# Patient Record
Sex: FEMALE | Race: OTHER | HISPANIC OR LATINO | URBAN - METROPOLITAN AREA
[De-identification: names, ages, dates, MRNs, and addresses within clinical notes are randomized per-mention and may not be internally consistent; named-entity substitution may affect disease eponyms.]

---

## 2017-07-05 ENCOUNTER — EMERGENCY (EMERGENCY)
Facility: HOSPITAL | Age: 62
LOS: 1 days | Discharge: HOME | End: 2017-07-05
Attending: EMERGENCY MEDICINE | Admitting: EMERGENCY MEDICINE
Payer: COMMERCIAL

## 2017-07-05 VITALS
RESPIRATION RATE: 20 BRPM | HEART RATE: 76 BPM | TEMPERATURE: 99 F | OXYGEN SATURATION: 96 % | DIASTOLIC BLOOD PRESSURE: 81 MMHG | SYSTOLIC BLOOD PRESSURE: 147 MMHG

## 2017-07-05 PROCEDURE — 99284 EMERGENCY DEPT VISIT MOD MDM: CPT

## 2017-07-05 RX ORDER — OXYCODONE AND ACETAMINOPHEN 5; 325 MG/1; MG/1
1 TABLET ORAL ONCE
Qty: 0 | Refills: 0 | Status: DISCONTINUED | OUTPATIENT
Start: 2017-07-05 | End: 2017-07-05

## 2017-07-05 RX ADMIN — Medication 300 MILLIGRAM(S): at 19:27

## 2017-07-05 RX ADMIN — OXYCODONE AND ACETAMINOPHEN 1 TABLET(S): 5; 325 TABLET ORAL at 19:27

## 2017-07-05 NOTE — CONSULT NOTE ADULT - CONSULT REASON
Left facial swelling after root canal, concern for abscess
Left eye swelling s/p root canal therapy. Concern for abscess.

## 2017-07-05 NOTE — ED PROVIDER NOTE - ENMT, MLM
Airway patent, Nasal mucosa clear. Mouth with normal mucosa. left buccal area swelling, no discharge intraorally

## 2017-07-05 NOTE — ED PROVIDER NOTE - MEDICAL DECISION MAKING DETAILS
patient with left facial swelling after recent root canal. concern for cellulitis vs abscess. will consult dental

## 2017-07-05 NOTE — ED ADULT NURSE NOTE - OBJECTIVE STATEMENT
pt c/o left facial pain associated with edema s/p dental procedure a few days ago.  as per pt, she had the procedure and was given abx to take.  her face continued to get more swollen with more pain.  pt is awake, alert and responsive to all stimuli.  no sob or respiratory distress noted at this time.  pt medicated, as per md's orders and is awaiting dental team for further assessment.  will continue to monitor.

## 2017-07-05 NOTE — CONSULT NOTE ADULT - SUBJECTIVE AND OBJECTIVE BOX
Patient is a 62y old  Female who presents with a chief complaint of left eye swelling s/p root canal therapy.    HPI: non-contributory    PAST MEDICAL & SURGICAL HISTORY:  No pertinent past medical history  No significant past surgical history    MEDICATIONS (home):  Azitrhomycin  Percocet    MEDICATIONS  (PRN):      Allergies:  Penicillins (Rash)    *Last Dental Visit: Monday July 3rd, 2017 for Root canal therapy    Vital Signs Last 24 Hrs  T(C): 37.3 (05 Jul 2017 18:19), Max: 37.3 (05 Jul 2017 18:19)  T(F): 99.2 (05 Jul 2017 18:19), Max: 99.2 (05 Jul 2017 18:19)  HR: 76 (05 Jul 2017 18:19) (76 - 76)  BP: 147/81 (05 Jul 2017 18:19) (147/81 - 147/81)  BP(mean): --  RR: 20 (05 Jul 2017 18:19) (20 - 20)  SpO2: 96% (05 Jul 2017 18:19) (96% - 96%)    EOE:  TMJ (   ) clicks                    (    ) pops                    (    ) crepitus             Mandible <<FROM>>             Facial bones and MOM <<grossly intact>>             (   ) trismus             (   ) LAD             (   ) swelling             (   ) asymmetry             (   ) palpation             (   ) SOB             (   ) dysphagia             (   ) LOC    IOE:  <<permanent/primary/mixed>> dentition: <<grossly intact>> OR <<multiple carious teeth>> OR <<multiple missing teeth>>           hard/soft palate:  (   ) palatal torus           tongue/FOM <<WNL>>           labial/buccal mucosa <<WNL>>           (   ) percussion           (   ) palpation           (   ) swelling     Dentition present: <<   >>  Mobility: <<  >>  Caries: <<   >>     Radiographs:    LABS:                        RADIOLOGY & ADDITIONAL STUDIES:    ASSESSMENT:    PROCEDURE:  Verbal and written consent given.     RECOMMENDATIONS:   1) <<   >>  2) Dental F/U with outpatient dentist for comprehensive dental care.   3) If any difficulty swallowing/breathing, fever occur, page dental.     Resident Name, pager #  Oral surgeon consulted:
Patient is a 62y old  Female who presents with a chief complaint of swelling on pain in upper left area after root canal treatment 2 days ago.    HPI: patient had root canal treatment tooth #13 on Monday 7/3/17. Canals obturated and IRM placed. Monday night pt experienced pain that worsened into Tuesday. Her dentist prescribed her azithromycin 250mg, ibuprofen 600mg and hydrocodone. Patient reports that medicines did not help alleviate pain. Starting Wednesday, throbbing pain spread to her orbital area and down the left side of her face.      PAST MEDICAL & SURGICAL HISTORY:  No pertinent past medical history  No significant past surgical history    ( -  ) heart valve replacement  ( -  ) joint replacement  (  - ) pregnancy    MEDICATIONS  (STANDING):    MEDICATIONS  (PRN): Ibuprofen 600mg, azithromycin 250mg, hydrocodone 300mg (prescribed by dentist who performed root canal)  Clindamycin 300mg, oxycodone 5mg/acetominophen 325mg (prescribed by ER attending Dr. Isaac)      Allergies    penicillins (Rash)      *Last Dental Visit: 7/3/17    Vital Signs Last 24 Hrs  T(C): 37.3 (05 Jul 2017 18:19), Max: 37.3 (05 Jul 2017 18:19)  T(F): 99.2 (05 Jul 2017 18:19), Max: 99.2 (05 Jul 2017 18:19)  HR: 76 (05 Jul 2017 18:19) (76 - 76)  BP: 147/81 (05 Jul 2017 18:19) (147/81 - 147/81)  BP(mean): --  RR: 20 (05 Jul 2017 18:19) (20 - 20)  SpO2: 96% (05 Jul 2017 18:19) (96% - 96%)    EOE:  TMJ (  - ) clicks                    (  -  ) pops                    ( -   ) crepitus             Mandible FROM             Facial bones and MOM grossly intact             (  + ) trismus             (  - ) LAD             (  + ) swelling             ( +  ) asymmetry             ( +  ) palpation             (  - ) SOB             ( -  ) dysphagia               IOE:  permanent dentition:multiple missing teeth           hard/soft palate:  ( -  ) palatal torus           tongue/FOM WNL           labial/buccal mucosa: upper left mucosa firm but erythematous            (  + ) percussion           (   +) palpation           (  + ) swelling            ( -  ) abscess           (  - ) sinus tract    Dentition present: permanent  Mobility: 0  Caries: none    LABS: none    *DENTAL RADIOGRAPHS: one periapical radiograph of #12 and #13 taken, panoramic radiograph unable to obtain due to technical issues    RADIOLOGY & ADDITIONAL STUDIES: none    *ASSESSMENT:    Clinical evaluation reveals extraoral swelling and asymmetry of left side of patient's face. Swelling extends from inferior orbital rim to the superior height of the mandible. Intraorally, patient was tender upon palpation of palatal and buccal mucosa around #12, #13. However, the buccal vestibule felt firm, not fluctuant. Buccal mucosa around #12, #13 was erythematous. Radiographic findings include widened PDL of #11, #12, #13.       RECOMMENDATIONS:  1) Continue Clindamycin 300mg TID, oxycodone Q4h that was prescribed by Dr. Isaac  2) Dental F/U with dentist who performed root canal #13 or return to dental clinic if she cannot see him within the next 2 days.  3) If any difficulty swallowing/breathing, fever occur, return to ER.     Malina Calero DDS pager #[phone]

## 2017-09-04 ENCOUNTER — RESULT REVIEW (OUTPATIENT)
Age: 62
End: 2017-09-04

## 2018-06-13 NOTE — ED PROVIDER NOTE - RESPIRATORY NEGATIVE STATEMENT, MLM
Subjective:       Patient ID: Leona Pizano is a 45 y.o. female.    Chief Complaint: Follow-up (2 weeks )    HPI: Pt presents to clinic today known to me for bp f/u. She brought in her logs 140/97 hr 93>>128/81 Hr 75>>117/82 hr 75>>120/80 hr 75>>121/87 HR80. She reports that she is feeling well. No sluggishness. No dizziness.   Review of Systems   Constitutional: Negative for chills, fatigue, fever and unexpected weight change.   HENT: Negative for congestion, ear pain, sore throat and trouble swallowing.    Eyes: Negative for pain and visual disturbance.   Respiratory: Negative for cough, chest tightness and shortness of breath.    Cardiovascular: Negative for chest pain, palpitations and leg swelling.   Gastrointestinal: Negative for abdominal distention, abdominal pain, constipation, diarrhea and vomiting.   Genitourinary: Negative for difficulty urinating, dysuria, flank pain, frequency and hematuria.   Musculoskeletal: Negative for back pain, gait problem, joint swelling, neck pain and neck stiffness.   Skin: Negative for rash and wound.   Neurological: Negative for dizziness, seizures, speech difficulty, light-headedness and headaches.       Objective:      Physical Exam   Constitutional: She is oriented to person, place, and time. She appears well-developed and well-nourished.   HENT:   Head: Normocephalic and atraumatic.   Eyes: Conjunctivae and EOM are normal. Pupils are equal, round, and reactive to light.   Neck: Normal range of motion. Neck supple.   Cardiovascular: Normal rate, regular rhythm, normal heart sounds and intact distal pulses.    No murmur heard.  Pulmonary/Chest: Effort normal and breath sounds normal. No respiratory distress. She has no wheezes. She has no rales.   Abdominal: Soft. Bowel sounds are normal. She exhibits no distension and no mass. There is no tenderness. There is no guarding.   Musculoskeletal: Normal range of motion.   Neurological: She is alert and oriented to  person, place, and time.   Skin: Skin is warm and dry.   Psychiatric: She has a normal mood and affect. Her behavior is normal. Judgment and thought content normal.   Nursing note and vitals reviewed.      Assessment:       1. Hypertension, unspecified type        Plan:     Problem List Items Addressed This Visit     Hypertension - Primary    Relevant Medications    losartan-hydrochlorothiazide 50-12.5 mg (HYZAAR) 50-12.5 mg per tablet        Combine losartan/hctz     no chest pain, no cough, and no shortness of breath.

## 2018-09-06 ENCOUNTER — RESULT REVIEW (OUTPATIENT)
Age: 63
End: 2018-09-06

## 2018-12-15 ENCOUNTER — TRANSCRIPTION ENCOUNTER (OUTPATIENT)
Age: 63
End: 2018-12-15

## 2019-02-18 ENCOUNTER — TRANSCRIPTION ENCOUNTER (OUTPATIENT)
Age: 64
End: 2019-02-18

## 2019-04-09 ENCOUNTER — EMERGENCY (EMERGENCY)
Facility: HOSPITAL | Age: 64
LOS: 1 days | Discharge: HOME | End: 2019-04-09
Attending: EMERGENCY MEDICINE
Payer: COMMERCIAL

## 2019-04-09 VITALS
TEMPERATURE: 98 F | DIASTOLIC BLOOD PRESSURE: 79 MMHG | OXYGEN SATURATION: 96 % | HEART RATE: 88 BPM | RESPIRATION RATE: 18 BRPM | SYSTOLIC BLOOD PRESSURE: 167 MMHG

## 2019-04-09 VITALS
HEART RATE: 81 BPM | DIASTOLIC BLOOD PRESSURE: 78 MMHG | TEMPERATURE: 98 F | OXYGEN SATURATION: 98 % | RESPIRATION RATE: 18 BRPM | SYSTOLIC BLOOD PRESSURE: 148 MMHG

## 2019-04-09 PROCEDURE — 99283 EMERGENCY DEPT VISIT LOW MDM: CPT

## 2019-04-09 PROCEDURE — 99283 EMERGENCY DEPT VISIT LOW MDM: CPT | Mod: 25

## 2019-04-09 RX ORDER — DIPHENHYDRAMINE HCL 50 MG
50 CAPSULE ORAL ONCE
Qty: 0 | Refills: 0 | Status: COMPLETED | OUTPATIENT
Start: 2019-04-09 | End: 2019-04-09

## 2019-04-09 RX ADMIN — Medication 50 MILLIGRAM(S): at 02:33

## 2019-04-09 RX ADMIN — Medication 50 MILLIGRAM(S): at 02:34

## 2019-04-09 NOTE — ED PROVIDER NOTE - OBJECTIVE STATEMENT
63 female well, on no meds, no pmh, presenting with rash since 3 pm , no new foods, did have rice and chix for lunch, no new . now w very itchy rash. no sob. no abdominal pain. no other all s/s. did not take meds yet.

## 2019-04-09 NOTE — ED ADULT NURSE NOTE - OBJECTIVE STATEMENT
62 y/o female no significant medical history presents to ED c/o urticaria since 3pm 4/8. Pt was at bus stop coming home, noticed many flying bugs by her. Was wearing short sleeves. Doesn't recall being bitten. Since then, pt has been having itching on b/l arms, chest, back, ears, upper lip. Did not take benadryl for discomfort. Denies SOB, chest pain, n/v/d, abd. pain, difficulty breathing. Lungs clear b/l. Hives noted to mentioned areas. Safety and comfort provided.

## 2019-04-09 NOTE — ED ADULT TRIAGE NOTE - CHIEF COMPLAINT QUOTE
itchiness and redness noted to B/L arms   states some upper lip tingling   denies difficulty breathing or swallowing

## 2019-04-10 ENCOUNTER — TRANSCRIPTION ENCOUNTER (OUTPATIENT)
Age: 64
End: 2019-04-10

## 2020-01-19 ENCOUNTER — TRANSCRIPTION ENCOUNTER (OUTPATIENT)
Age: 65
End: 2020-01-19

## 2020-04-25 ENCOUNTER — MESSAGE (OUTPATIENT)
Age: 65
End: 2020-04-25

## 2020-05-06 LAB
SARS-COV-2 IGG SERPL IA-ACNC: 0.2 RATIO
SARS-COV-2 IGG SERPL QL IA: NEGATIVE

## 2020-10-21 ENCOUNTER — OUTPATIENT (OUTPATIENT)
Dept: OUTPATIENT SERVICES | Facility: HOSPITAL | Age: 65
LOS: 1 days | End: 2020-10-21
Payer: COMMERCIAL

## 2020-10-21 ENCOUNTER — NON-APPOINTMENT (OUTPATIENT)
Age: 65
End: 2020-10-21

## 2020-10-21 ENCOUNTER — APPOINTMENT (OUTPATIENT)
Dept: CARDIOLOGY | Facility: CLINIC | Age: 65
End: 2020-10-21
Payer: COMMERCIAL

## 2020-10-21 VITALS
SYSTOLIC BLOOD PRESSURE: 135 MMHG | WEIGHT: 148 LBS | BODY MASS INDEX: 29.84 KG/M2 | DIASTOLIC BLOOD PRESSURE: 74 MMHG | HEIGHT: 59 IN | HEART RATE: 70 BPM | OXYGEN SATURATION: 97 %

## 2020-10-21 DIAGNOSIS — Z83.3 FAMILY HISTORY OF DIABETES MELLITUS: ICD-10-CM

## 2020-10-21 DIAGNOSIS — Z82.49 FAMILY HISTORY OF ISCHEMIC HEART DISEASE AND OTHER DISEASES OF THE CIRCULATORY SYSTEM: ICD-10-CM

## 2020-10-21 DIAGNOSIS — Z78.9 OTHER SPECIFIED HEALTH STATUS: ICD-10-CM

## 2020-10-21 DIAGNOSIS — R07.9 CHEST PAIN, UNSPECIFIED: ICD-10-CM

## 2020-10-21 DIAGNOSIS — Z87.891 PERSONAL HISTORY OF NICOTINE DEPENDENCE: ICD-10-CM

## 2020-10-21 PROCEDURE — 99205 OFFICE O/P NEW HI 60 MIN: CPT | Mod: 25

## 2020-10-21 PROCEDURE — 93018 CV STRESS TEST I&R ONLY: CPT

## 2020-10-21 PROCEDURE — 93017 CV STRESS TEST TRACING ONLY: CPT

## 2020-10-21 PROCEDURE — 93016 CV STRESS TEST SUPVJ ONLY: CPT

## 2020-10-21 PROCEDURE — 93000 ELECTROCARDIOGRAM COMPLETE: CPT

## 2020-10-21 PROCEDURE — 99401 PREV MED CNSL INDIV APPRX 15: CPT

## 2020-10-21 RX ORDER — ADHESIVE TAPE 3"X 2.3 YD
50 MCG TAPE, NON-MEDICATED TOPICAL
Refills: 0 | Status: DISCONTINUED | COMMUNITY
Start: 2020-10-21 | End: 2020-10-21

## 2020-10-21 RX ORDER — ERGOCALCIFEROL 1.25 MG/1
1.25 MG CAPSULE, LIQUID FILLED ORAL
Refills: 0 | Status: ACTIVE | COMMUNITY
Start: 2020-10-21

## 2020-10-26 ENCOUNTER — OUTPATIENT (OUTPATIENT)
Dept: OUTPATIENT SERVICES | Facility: HOSPITAL | Age: 65
LOS: 1 days | End: 2020-10-26
Payer: COMMERCIAL

## 2020-10-26 ENCOUNTER — APPOINTMENT (OUTPATIENT)
Dept: CV DIAGNOSITCS | Facility: HOSPITAL | Age: 65
End: 2020-10-26

## 2020-10-26 DIAGNOSIS — R07.9 CHEST PAIN, UNSPECIFIED: ICD-10-CM

## 2020-10-26 PROCEDURE — 93306 TTE W/DOPPLER COMPLETE: CPT

## 2020-10-26 PROCEDURE — 93306 TTE W/DOPPLER COMPLETE: CPT | Mod: 26

## 2020-11-10 ENCOUNTER — OUTPATIENT (OUTPATIENT)
Dept: OUTPATIENT SERVICES | Facility: HOSPITAL | Age: 65
LOS: 1 days | End: 2020-11-10
Payer: COMMERCIAL

## 2020-11-10 ENCOUNTER — RESULT REVIEW (OUTPATIENT)
Age: 65
End: 2020-11-10

## 2020-11-10 DIAGNOSIS — Z11.59 ENCOUNTER FOR SCREENING FOR OTHER VIRAL DISEASES: ICD-10-CM

## 2020-11-10 LAB — SARS-COV-2 RNA SPEC QL NAA+PROBE: SIGNIFICANT CHANGE UP

## 2020-11-10 PROCEDURE — U0003: CPT

## 2020-11-11 ENCOUNTER — APPOINTMENT (OUTPATIENT)
Dept: CARDIOLOGY | Facility: CLINIC | Age: 65
End: 2020-11-11
Payer: COMMERCIAL

## 2020-11-11 ENCOUNTER — NON-APPOINTMENT (OUTPATIENT)
Age: 65
End: 2020-11-11

## 2020-11-11 VITALS
WEIGHT: 148 LBS | BODY MASS INDEX: 29.84 KG/M2 | OXYGEN SATURATION: 97 % | HEART RATE: 69 BPM | HEIGHT: 59 IN | DIASTOLIC BLOOD PRESSURE: 79 MMHG | SYSTOLIC BLOOD PRESSURE: 139 MMHG

## 2020-11-11 PROCEDURE — 99215 OFFICE O/P EST HI 40 MIN: CPT

## 2020-11-11 PROCEDURE — 93000 ELECTROCARDIOGRAM COMPLETE: CPT

## 2020-11-11 PROCEDURE — 99072 ADDL SUPL MATRL&STAF TM PHE: CPT

## 2020-11-12 ENCOUNTER — FORM ENCOUNTER (OUTPATIENT)
Age: 65
End: 2020-11-12

## 2020-11-13 ENCOUNTER — OUTPATIENT (OUTPATIENT)
Dept: OUTPATIENT SERVICES | Facility: HOSPITAL | Age: 65
LOS: 1 days | End: 2020-11-13
Payer: COMMERCIAL

## 2020-11-13 VITALS
HEART RATE: 66 BPM | OXYGEN SATURATION: 99 % | SYSTOLIC BLOOD PRESSURE: 135 MMHG | TEMPERATURE: 98 F | DIASTOLIC BLOOD PRESSURE: 65 MMHG | RESPIRATION RATE: 20 BRPM

## 2020-11-13 VITALS
RESPIRATION RATE: 18 BRPM | HEART RATE: 60 BPM | DIASTOLIC BLOOD PRESSURE: 64 MMHG | SYSTOLIC BLOOD PRESSURE: 127 MMHG | OXYGEN SATURATION: 98 %

## 2020-11-13 DIAGNOSIS — R94.39 ABNORMAL RESULT OF OTHER CARDIOVASCULAR FUNCTION STUDY: ICD-10-CM

## 2020-11-13 LAB
ANION GAP SERPL CALC-SCNC: 10 MMOL/L — SIGNIFICANT CHANGE UP (ref 5–17)
BUN SERPL-MCNC: 13 MG/DL — SIGNIFICANT CHANGE UP (ref 7–23)
CALCIUM SERPL-MCNC: 9.2 MG/DL — SIGNIFICANT CHANGE UP (ref 8.4–10.5)
CHLORIDE SERPL-SCNC: 108 MMOL/L — SIGNIFICANT CHANGE UP (ref 96–108)
CO2 SERPL-SCNC: 22 MMOL/L — SIGNIFICANT CHANGE UP (ref 22–31)
CREAT SERPL-MCNC: 0.61 MG/DL — SIGNIFICANT CHANGE UP (ref 0.5–1.3)
GLUCOSE SERPL-MCNC: 106 MG/DL — HIGH (ref 70–99)
HCT VFR BLD CALC: 44.5 % — SIGNIFICANT CHANGE UP (ref 34.5–45)
HGB BLD-MCNC: 14.8 G/DL — SIGNIFICANT CHANGE UP (ref 11.5–15.5)
MCHC RBC-ENTMCNC: 31.2 PG — SIGNIFICANT CHANGE UP (ref 27–34)
MCHC RBC-ENTMCNC: 33.3 GM/DL — SIGNIFICANT CHANGE UP (ref 32–36)
MCV RBC AUTO: 93.9 FL — SIGNIFICANT CHANGE UP (ref 80–100)
NRBC # BLD: 0 /100 WBCS — SIGNIFICANT CHANGE UP (ref 0–0)
PLATELET # BLD AUTO: 296 K/UL — SIGNIFICANT CHANGE UP (ref 150–400)
POTASSIUM SERPL-MCNC: 4.5 MMOL/L — SIGNIFICANT CHANGE UP (ref 3.5–5.3)
POTASSIUM SERPL-SCNC: 4.5 MMOL/L — SIGNIFICANT CHANGE UP (ref 3.5–5.3)
RBC # BLD: 4.74 M/UL — SIGNIFICANT CHANGE UP (ref 3.8–5.2)
RBC # FLD: 14.1 % — SIGNIFICANT CHANGE UP (ref 10.3–14.5)
SODIUM SERPL-SCNC: 140 MMOL/L — SIGNIFICANT CHANGE UP (ref 135–145)
WBC # BLD: 6.94 K/UL — SIGNIFICANT CHANGE UP (ref 3.8–10.5)
WBC # FLD AUTO: 6.94 K/UL — SIGNIFICANT CHANGE UP (ref 3.8–10.5)

## 2020-11-13 PROCEDURE — C1769: CPT

## 2020-11-13 PROCEDURE — 93454 CORONARY ARTERY ANGIO S&I: CPT

## 2020-11-13 PROCEDURE — C1894: CPT

## 2020-11-13 PROCEDURE — C1887: CPT

## 2020-11-13 PROCEDURE — 93010 ELECTROCARDIOGRAM REPORT: CPT

## 2020-11-13 PROCEDURE — 93454 CORONARY ARTERY ANGIO S&I: CPT | Mod: 26

## 2020-11-13 PROCEDURE — 85027 COMPLETE CBC AUTOMATED: CPT

## 2020-11-13 PROCEDURE — 99152 MOD SED SAME PHYS/QHP 5/>YRS: CPT

## 2020-11-13 PROCEDURE — 80048 BASIC METABOLIC PNL TOTAL CA: CPT

## 2020-11-13 PROCEDURE — 93005 ELECTROCARDIOGRAM TRACING: CPT

## 2020-11-13 RX ORDER — AZITHROMYCIN 500 MG/1
0 TABLET, FILM COATED ORAL
Qty: 0 | Refills: 0 | DISCHARGE

## 2020-11-13 RX ORDER — CHOLECALCIFEROL (VITAMIN D3) 125 MCG
1 CAPSULE ORAL
Qty: 0 | Refills: 0 | DISCHARGE

## 2020-11-13 NOTE — ASU DISCHARGE PLAN (ADULT/PEDIATRIC) - CALL YOUR DOCTOR IF YOU HAVE ANY OF THE FOLLOWING:
Swelling that gets worse/Pain not relieved by Medications/Fever greater than (need to indicate Fahrenheit or Celsius)/Wound/Surgical Site with redness, or foul smelling discharge or pus/Bleeding that does not stop

## 2020-11-13 NOTE — ASU DISCHARGE PLAN (ADULT/PEDIATRIC) - CARE PROVIDER_API CALL
Rey Hankins J  CARDIOVASCULAR DISEASE  [address], [address] [place] [id number]  Phone: [phone]  Fax: [phone]  Follow Up Time:

## 2020-12-21 NOTE — ED PROVIDER NOTE - CHILD ABUSE FACILITY
[FreeTextEntry1] : The patient has an eating disorder and she was counseled.  She appears to be doing fairly well.  Her weight is up 1-2 pounds from the last visit.  She sees her therapist and a dietician and she has an appointment for tomorrow to see a psychiatrist.  She has good support from her boyfriend and she speaks to her mother.  \par \par Hold off on blood tests today- consider next time.\par \par Follow-up in a month or sooner PRN.\par \par She requests a prescription for a COVID-19 test because she is going home to see her parents in a few days.  
[facility]

## 2021-02-10 ENCOUNTER — APPOINTMENT (OUTPATIENT)
Dept: CARDIOLOGY | Facility: CLINIC | Age: 66
End: 2021-02-10
Payer: COMMERCIAL

## 2021-02-10 ENCOUNTER — NON-APPOINTMENT (OUTPATIENT)
Age: 66
End: 2021-02-10

## 2021-02-10 VITALS
DIASTOLIC BLOOD PRESSURE: 72 MMHG | HEART RATE: 64 BPM | SYSTOLIC BLOOD PRESSURE: 126 MMHG | HEIGHT: 59 IN | BODY MASS INDEX: 29.23 KG/M2 | OXYGEN SATURATION: 96 % | WEIGHT: 145 LBS

## 2021-02-10 PROCEDURE — 93000 ELECTROCARDIOGRAM COMPLETE: CPT

## 2021-02-10 PROCEDURE — 99215 OFFICE O/P EST HI 40 MIN: CPT

## 2021-02-10 PROCEDURE — 99072 ADDL SUPL MATRL&STAF TM PHE: CPT

## 2021-02-11 LAB
ALBUMIN SERPL ELPH-MCNC: 4.2 G/DL
ALP BLD-CCNC: 125 U/L
ALT SERPL-CCNC: 21 U/L
ANION GAP SERPL CALC-SCNC: 11 MMOL/L
AST SERPL-CCNC: 16 U/L
BASOPHILS # BLD AUTO: 0.05 K/UL
BASOPHILS NFR BLD AUTO: 0.6 %
BILIRUB SERPL-MCNC: 0.5 MG/DL
BUN SERPL-MCNC: 15 MG/DL
CALCIUM SERPL-MCNC: 9.4 MG/DL
CHLORIDE SERPL-SCNC: 105 MMOL/L
CHOLEST SERPL-MCNC: 153 MG/DL
CO2 SERPL-SCNC: 23 MMOL/L
CREAT SERPL-MCNC: 0.85 MG/DL
EOSINOPHIL # BLD AUTO: 0.12 K/UL
EOSINOPHIL NFR BLD AUTO: 1.5 %
ESTIMATED AVERAGE GLUCOSE: 128 MG/DL
GLUCOSE SERPL-MCNC: 100 MG/DL
HBA1C MFR BLD HPLC: 6.1 %
HCT VFR BLD CALC: 44.4 %
HDLC SERPL-MCNC: 52 MG/DL
HGB BLD-MCNC: 14.3 G/DL
IMM GRANULOCYTES NFR BLD AUTO: 0.2 %
LDLC SERPL CALC-MCNC: 65 MG/DL
LYMPHOCYTES # BLD AUTO: 2.61 K/UL
LYMPHOCYTES NFR BLD AUTO: 32 %
MAN DIFF?: NORMAL
MCHC RBC-ENTMCNC: 30.8 PG
MCHC RBC-ENTMCNC: 32.2 GM/DL
MCV RBC AUTO: 95.5 FL
MONOCYTES # BLD AUTO: 0.45 K/UL
MONOCYTES NFR BLD AUTO: 5.5 %
NEUTROPHILS # BLD AUTO: 4.91 K/UL
NEUTROPHILS NFR BLD AUTO: 60.2 %
NONHDLC SERPL-MCNC: 101 MG/DL
NT-PROBNP SERPL-MCNC: 91 PG/ML
PLATELET # BLD AUTO: 276 K/UL
POTASSIUM SERPL-SCNC: 4.8 MMOL/L
PROT SERPL-MCNC: 6.8 G/DL
RBC # BLD: 4.65 M/UL
RBC # FLD: 14.3 %
SODIUM SERPL-SCNC: 139 MMOL/L
TRIGL SERPL-MCNC: 180 MG/DL
TSH SERPL-ACNC: 2.15 UIU/ML
WBC # FLD AUTO: 8.16 K/UL

## 2021-02-19 ENCOUNTER — TRANSCRIPTION ENCOUNTER (OUTPATIENT)
Age: 66
End: 2021-02-19

## 2021-02-20 ENCOUNTER — APPOINTMENT (OUTPATIENT)
Dept: ULTRASOUND IMAGING | Facility: IMAGING CENTER | Age: 66
End: 2021-02-20
Payer: COMMERCIAL

## 2021-02-20 ENCOUNTER — APPOINTMENT (OUTPATIENT)
Dept: MAMMOGRAPHY | Facility: IMAGING CENTER | Age: 66
End: 2021-02-20
Payer: COMMERCIAL

## 2021-02-20 ENCOUNTER — OUTPATIENT (OUTPATIENT)
Dept: OUTPATIENT SERVICES | Facility: HOSPITAL | Age: 66
LOS: 1 days | End: 2021-02-20
Payer: COMMERCIAL

## 2021-02-20 DIAGNOSIS — Z00.8 ENCOUNTER FOR OTHER GENERAL EXAMINATION: ICD-10-CM

## 2021-02-20 PROCEDURE — 76641 ULTRASOUND BREAST COMPLETE: CPT

## 2021-02-20 PROCEDURE — 77067 SCR MAMMO BI INCL CAD: CPT | Mod: 26

## 2021-02-20 PROCEDURE — 77067 SCR MAMMO BI INCL CAD: CPT

## 2021-02-20 PROCEDURE — 76641 ULTRASOUND BREAST COMPLETE: CPT | Mod: 26,50

## 2021-02-20 PROCEDURE — 77063 BREAST TOMOSYNTHESIS BI: CPT

## 2021-02-20 PROCEDURE — 77063 BREAST TOMOSYNTHESIS BI: CPT | Mod: 26

## 2021-06-03 ENCOUNTER — TRANSCRIPTION ENCOUNTER (OUTPATIENT)
Age: 66
End: 2021-06-03

## 2021-06-12 ENCOUNTER — TRANSCRIPTION ENCOUNTER (OUTPATIENT)
Age: 66
End: 2021-06-12

## 2021-08-11 ENCOUNTER — NON-APPOINTMENT (OUTPATIENT)
Age: 66
End: 2021-08-11

## 2021-08-11 ENCOUNTER — APPOINTMENT (OUTPATIENT)
Dept: CARDIOLOGY | Facility: CLINIC | Age: 66
End: 2021-08-11
Payer: COMMERCIAL

## 2021-08-11 VITALS
BODY MASS INDEX: 29.84 KG/M2 | DIASTOLIC BLOOD PRESSURE: 75 MMHG | HEIGHT: 59 IN | HEART RATE: 67 BPM | WEIGHT: 148 LBS | OXYGEN SATURATION: 96 % | SYSTOLIC BLOOD PRESSURE: 117 MMHG

## 2021-08-11 PROCEDURE — 93000 ELECTROCARDIOGRAM COMPLETE: CPT

## 2021-08-11 PROCEDURE — 99215 OFFICE O/P EST HI 40 MIN: CPT

## 2022-02-16 ENCOUNTER — APPOINTMENT (OUTPATIENT)
Dept: CARDIOLOGY | Facility: CLINIC | Age: 67
End: 2022-02-16
Payer: COMMERCIAL

## 2022-02-16 ENCOUNTER — NON-APPOINTMENT (OUTPATIENT)
Age: 67
End: 2022-02-16

## 2022-02-16 VITALS
WEIGHT: 148 LBS | DIASTOLIC BLOOD PRESSURE: 79 MMHG | HEART RATE: 74 BPM | HEIGHT: 59 IN | OXYGEN SATURATION: 96 % | SYSTOLIC BLOOD PRESSURE: 133 MMHG | BODY MASS INDEX: 29.84 KG/M2

## 2022-02-16 PROCEDURE — 99402 PREV MED CNSL INDIV APPRX 30: CPT

## 2022-02-16 PROCEDURE — 99215 OFFICE O/P EST HI 40 MIN: CPT | Mod: 25

## 2022-02-16 PROCEDURE — 93000 ELECTROCARDIOGRAM COMPLETE: CPT

## 2022-03-22 ENCOUNTER — RESULT REVIEW (OUTPATIENT)
Age: 67
End: 2022-03-22

## 2022-04-09 ENCOUNTER — APPOINTMENT (OUTPATIENT)
Dept: MAMMOGRAPHY | Facility: CLINIC | Age: 67
End: 2022-04-09
Payer: COMMERCIAL

## 2022-04-09 ENCOUNTER — OUTPATIENT (OUTPATIENT)
Dept: OUTPATIENT SERVICES | Facility: HOSPITAL | Age: 67
LOS: 1 days | End: 2022-04-09
Payer: COMMERCIAL

## 2022-04-09 ENCOUNTER — APPOINTMENT (OUTPATIENT)
Dept: ULTRASOUND IMAGING | Facility: CLINIC | Age: 67
End: 2022-04-09
Payer: COMMERCIAL

## 2022-04-09 DIAGNOSIS — Z00.8 ENCOUNTER FOR OTHER GENERAL EXAMINATION: ICD-10-CM

## 2022-04-09 PROCEDURE — 77067 SCR MAMMO BI INCL CAD: CPT | Mod: 26

## 2022-04-09 PROCEDURE — 77067 SCR MAMMO BI INCL CAD: CPT

## 2022-04-09 PROCEDURE — 77063 BREAST TOMOSYNTHESIS BI: CPT | Mod: 26

## 2022-04-09 PROCEDURE — 77063 BREAST TOMOSYNTHESIS BI: CPT

## 2022-07-01 ENCOUNTER — RESULT REVIEW (OUTPATIENT)
Age: 67
End: 2022-07-01

## 2023-01-01 ENCOUNTER — RX RENEWAL (OUTPATIENT)
Age: 68
End: 2023-01-01

## 2023-01-01 RX ORDER — ATORVASTATIN CALCIUM 40 MG/1
40 TABLET, FILM COATED ORAL
Qty: 90 | Refills: 2 | Status: ACTIVE | COMMUNITY
Start: 2020-10-21 | End: 1900-01-01

## 2023-04-14 ENCOUNTER — APPOINTMENT (OUTPATIENT)
Dept: MAMMOGRAPHY | Facility: IMAGING CENTER | Age: 68
End: 2023-04-14
Payer: COMMERCIAL

## 2023-04-14 ENCOUNTER — OUTPATIENT (OUTPATIENT)
Dept: OUTPATIENT SERVICES | Facility: HOSPITAL | Age: 68
LOS: 1 days | End: 2023-04-14
Payer: COMMERCIAL

## 2023-04-14 DIAGNOSIS — Z00.8 ENCOUNTER FOR OTHER GENERAL EXAMINATION: ICD-10-CM

## 2023-04-14 PROCEDURE — 77067 SCR MAMMO BI INCL CAD: CPT

## 2023-04-14 PROCEDURE — 77063 BREAST TOMOSYNTHESIS BI: CPT

## 2023-04-14 PROCEDURE — 77067 SCR MAMMO BI INCL CAD: CPT | Mod: 26

## 2023-04-14 PROCEDURE — 77063 BREAST TOMOSYNTHESIS BI: CPT | Mod: 26

## 2023-10-23 ENCOUNTER — NON-APPOINTMENT (OUTPATIENT)
Age: 68
End: 2023-10-23

## 2023-10-23 ENCOUNTER — APPOINTMENT (OUTPATIENT)
Dept: CARDIOLOGY | Facility: CLINIC | Age: 68
End: 2023-10-23
Payer: COMMERCIAL

## 2023-10-23 VITALS
BODY MASS INDEX: 29.84 KG/M2 | OXYGEN SATURATION: 99 % | WEIGHT: 148 LBS | HEIGHT: 59 IN | HEART RATE: 71 BPM | DIASTOLIC BLOOD PRESSURE: 77 MMHG | SYSTOLIC BLOOD PRESSURE: 144 MMHG

## 2023-10-23 DIAGNOSIS — R00.2 PALPITATIONS: ICD-10-CM

## 2023-10-23 DIAGNOSIS — R07.9 CHEST PAIN, UNSPECIFIED: ICD-10-CM

## 2023-10-23 PROCEDURE — 93000 ELECTROCARDIOGRAM COMPLETE: CPT

## 2023-10-23 PROCEDURE — 99204 OFFICE O/P NEW MOD 45 MIN: CPT

## 2023-11-06 ENCOUNTER — NON-APPOINTMENT (OUTPATIENT)
Age: 68
End: 2023-11-06

## 2023-11-18 PROBLEM — R00.2 HEART PALPITATIONS: Status: ACTIVE | Noted: 2020-10-21

## 2023-11-18 PROBLEM — R07.9 CHEST PAIN, CARDIAC: Status: ACTIVE | Noted: 2020-10-21

## 2024-01-03 ENCOUNTER — APPOINTMENT (OUTPATIENT)
Dept: CARDIOLOGY | Facility: CLINIC | Age: 69
End: 2024-01-03

## 2024-01-22 ENCOUNTER — APPOINTMENT (OUTPATIENT)
Dept: CV DIAGNOSITCS | Facility: HOSPITAL | Age: 69
End: 2024-01-22

## 2024-01-22 ENCOUNTER — OUTPATIENT (OUTPATIENT)
Dept: OUTPATIENT SERVICES | Facility: HOSPITAL | Age: 69
LOS: 1 days | End: 2024-01-22
Payer: COMMERCIAL

## 2024-01-22 VITALS
WEIGHT: 147.93 LBS | HEIGHT: 59 IN | DIASTOLIC BLOOD PRESSURE: 96 MMHG | TEMPERATURE: 99 F | SYSTOLIC BLOOD PRESSURE: 155 MMHG | RESPIRATION RATE: 12 BRPM | HEART RATE: 55 BPM | OXYGEN SATURATION: 98 %

## 2024-01-22 VITALS
HEART RATE: 50 BPM | DIASTOLIC BLOOD PRESSURE: 66 MMHG | SYSTOLIC BLOOD PRESSURE: 148 MMHG | OXYGEN SATURATION: 97 % | RESPIRATION RATE: 18 BRPM

## 2024-01-22 DIAGNOSIS — I25.10 ATHEROSCLEROTIC HEART DISEASE OF NATIVE CORONARY ARTERY WITHOUT ANGINA PECTORIS: ICD-10-CM

## 2024-01-22 PROCEDURE — 93312 ECHO TRANSESOPHAGEAL: CPT | Mod: 26

## 2024-01-22 PROCEDURE — 76377 3D RENDER W/INTRP POSTPROCES: CPT | Mod: 26

## 2024-01-22 PROCEDURE — 93320 DOPPLER ECHO COMPLETE: CPT | Mod: 26

## 2024-01-22 PROCEDURE — 76377 3D RENDER W/INTRP POSTPROCES: CPT

## 2024-01-22 PROCEDURE — 93325 DOPPLER ECHO COLOR FLOW MAPG: CPT

## 2024-01-22 PROCEDURE — 93312 ECHO TRANSESOPHAGEAL: CPT

## 2024-01-22 PROCEDURE — 93320 DOPPLER ECHO COMPLETE: CPT

## 2024-01-22 PROCEDURE — 93325 DOPPLER ECHO COLOR FLOW MAPG: CPT | Mod: 26

## 2024-01-22 NOTE — PRE-ANESTHESIA EVALUATION ADULT - NSANTHAIRWAYFT_ENT_ALL_CORE
FROM  Partial upper dentures removed  Neck circumference >16cm  TM distance <6cm  Interincisor distance <2 finger breadths

## 2024-02-23 ENCOUNTER — APPOINTMENT (OUTPATIENT)
Dept: OBGYN | Facility: CLINIC | Age: 69
End: 2024-02-23
Payer: COMMERCIAL

## 2024-02-23 PROCEDURE — 99397 PER PM REEVAL EST PAT 65+ YR: CPT

## 2024-04-29 ENCOUNTER — APPOINTMENT (OUTPATIENT)
Dept: MAMMOGRAPHY | Facility: CLINIC | Age: 69
End: 2024-04-29
Payer: COMMERCIAL

## 2024-04-29 PROCEDURE — 77063 BREAST TOMOSYNTHESIS BI: CPT

## 2024-04-29 PROCEDURE — 77067 SCR MAMMO BI INCL CAD: CPT

## 2024-11-01 ENCOUNTER — NON-APPOINTMENT (OUTPATIENT)
Age: 69
End: 2024-11-01

## 2024-11-02 ENCOUNTER — EMERGENCY (EMERGENCY)
Facility: HOSPITAL | Age: 69
LOS: 1 days | Discharge: HOME | End: 2024-11-02
Attending: STUDENT IN AN ORGANIZED HEALTH CARE EDUCATION/TRAINING PROGRAM
Payer: COMMERCIAL

## 2024-11-02 VITALS
TEMPERATURE: 98 F | RESPIRATION RATE: 18 BRPM | SYSTOLIC BLOOD PRESSURE: 148 MMHG | HEART RATE: 62 BPM | DIASTOLIC BLOOD PRESSURE: 87 MMHG | OXYGEN SATURATION: 98 %

## 2024-11-02 VITALS
OXYGEN SATURATION: 95 % | SYSTOLIC BLOOD PRESSURE: 151 MMHG | WEIGHT: 147.93 LBS | RESPIRATION RATE: 18 BRPM | HEART RATE: 68 BPM | HEIGHT: 59 IN | DIASTOLIC BLOOD PRESSURE: 81 MMHG | TEMPERATURE: 98 F

## 2024-11-02 PROCEDURE — 99283 EMERGENCY DEPT VISIT LOW MDM: CPT

## 2024-11-02 PROCEDURE — 99284 EMERGENCY DEPT VISIT MOD MDM: CPT

## 2024-11-02 RX ORDER — MECLIZINE HCL 25 MG
1 TABLET ORAL
Qty: 15 | Refills: 0
Start: 2024-11-02 | End: 2024-11-06

## 2024-11-02 RX ORDER — AZITHROMYCIN DIHYDRATE 200 MG/5ML
250 POWDER, FOR SUSPENSION ORAL
Qty: 1 | Refills: 0
Start: 2024-11-02 | End: 2024-11-06

## 2024-11-02 RX ORDER — CIPROFLOXACIN HYDROCHLORIDE 3.5 MG/ML
5 SOLUTION/ DROPS TOPICAL
Refills: 0 | Status: DISCONTINUED | OUTPATIENT
Start: 2024-11-03 | End: 2024-11-06

## 2024-11-02 RX ORDER — CIPROFLOXACIN HYDROCHLORIDE 3.5 MG/ML
5 SOLUTION/ DROPS TOPICAL ONCE
Refills: 0 | Status: COMPLETED | OUTPATIENT
Start: 2024-11-02 | End: 2024-11-02

## 2024-11-02 RX ORDER — CIPROFLOXACIN HYDROCHLORIDE 3.5 MG/ML
5 SOLUTION/ DROPS TOPICAL
Qty: 1 | Refills: 0
Start: 2024-11-02 | End: 2024-11-08

## 2024-11-02 RX ORDER — CIPROFLOXACIN HYDROCHLORIDE 3.5 MG/ML
SOLUTION/ DROPS TOPICAL
Refills: 0 | Status: DISCONTINUED | OUTPATIENT
Start: 2024-11-02 | End: 2024-11-06

## 2024-11-02 RX ADMIN — CIPROFLOXACIN HYDROCHLORIDE 5 DROP(S): 3.5 SOLUTION/ DROPS TOPICAL at 20:43

## 2024-11-02 NOTE — ED PROVIDER NOTE - PHYSICAL EXAMINATION
Const: Awake, alert, no acute distress.  Well appearing.  Moving comfortably on stretcher.  HEENT: NC/AT.  Moist mucous membranes.  No pharyngeal erythema, no exudates. Mild erythema noted above R tympanic membrane, pearly membrane, non bulging. L tympanic membrane pearly, non erythematous, non bulging. No tenderness over R mastoid process.   Eyes: Extraocular movements intact b/l.  Conjunctiva pink.  No scleral icterus.  Neck: Neck supple, full ROM without pain.   Cardiac: Regular rate and regular rhythm. S1 S2 present.  Peripheral pulses 2+ and symmetric. No LE edema.  Resp: Speaking in full sentences. No evidence of respiratory distress.  Breath sounds clear to auscultation b/l. Normal chest excursion.   Abd: Non-distended, no overlying skin changes.  Soft, non-tender, no guarding, no rigidity, no rebound tenderness.  No palpable masses.  Normal bowel sounds in all 4 quadrants.  Skin: Normal coloration.  No rashes, abrasions or lacerations.  Neuro: Awake, alert & oriented x 3.  Moves all extremities spontaneously.  No focal deficits.

## 2024-11-02 NOTE — ED PROVIDER NOTE - CLINICAL SUMMARY MEDICAL DECISION MAKING FREE TEXT BOX
71 y/o F with PMH of HTN p/w R ear pain x 2 weeks ago.    Given symptoms, likely vestibular neuritis vs viral labyrinthitis.   Less likely acoustic neuroma given no facial nerve palsy.  Less likely Meniere's disease since aural pressure is constant  Will prescribe oral azithromycin, ciprofloxacin drops, meclizine

## 2024-11-02 NOTE — ED ADULT TRIAGE NOTE - ISOLATION TYPE:
Procedure confirmed  Endoscopy     Via: [facility]    Instructions given: Mail     Prep Given: N/A    Call the office if there are any questions.     None

## 2024-11-02 NOTE — ED PROVIDER NOTE - NSFOLLOWUPINSTRUCTIONS_ED_ALL_ED_FT
Ear Infection (Acute Otitis Media)    You were seen today in the Emergency Department for an ear infection.    An ear infection is also called otitis media. You may have an ear infection in one or both ears.  Sometimes, antibiotics are given to help resolve the ear infection. If you were prescribed antibiotics, it is important to follow the instructions and complete the entire course.  Treat your pain with medications such as acetaminophen or ibuprofen is also important.    Return to the Emergency Department if:  -you see blood or pus draining from your ear.  -you seems confused or cannot stay awake.  -you have a stiff neck, headache, and a fever.  -you have pain behind his or her ear or when you move the earlobe.

## 2024-11-02 NOTE — ED PROVIDER NOTE - PATIENT PORTAL LINK FT
You can access the FollowMyHealth Patient Portal offered by [facility] by registering at the following website: [email or web address]. By joining [facility]’s FollowMyHealth portal, you will also be able to view your health information using other applications (apps) compatible with our system.

## 2024-11-02 NOTE — ED ADULT NURSE NOTE - OBJECTIVE STATEMENT
69y F AXO x4 came in for right ear pain. Patient reports that she had cold like symptoms two weeks ago and was diagnosed with a bilateral ear infection. Patient was started on doxycycline and reports left ear pain resolved but right ear pain is consistent. Patient reports for the last four days her right ear pain has gotten worse and she is now having difficulty with her balance and decreased hearing in the right ear. Patient also reports right temporal lobe headaches and episodes of nausea and vomiting yesterday. Denies fever, chills, blurry vision, diarrhea, dysuria, weakness, numbness, tingling, SOB, and chest pain. Family is present at bedside.

## 2024-11-02 NOTE — ED PROVIDER NOTE - ATTENDING CONTRIBUTION TO CARE
68 yo F with PMHx of hypertension presents with ear fullness. She reports prior diagnosis of ear infection started on doxycyline a few weeks ago. She states that her symptoms initially improved but reports continued ear fullness. Today, she noticed muffled sounds in the R ear and mild dizziness, prompting her to come to the ER. Denies nausea, vomiting,     PE: well appearing, nontoxic, no respiratory distress. Mild erythema of R ear canal. TM nonbulging, no erythema. No mastoid fullness or tenderness. Neuro nonfocal.  Skin intact. Psych normal mood.    MDM: Differential diagnosis includes but is not limited to otitis externa, otitis media, labyrinthitis, vestibular neuritis   Will provide ciprofloxacin drops and advised patient to take Z pack   Provided meclizine for dizziness  Advised ENT follow up

## 2024-11-02 NOTE — ED PROVIDER NOTE - OBJECTIVE STATEMENT
69 y/o F with PMH of HTN p/w R ear pain x 2 weeks ago. Pt had a URI symptoms including chills and a cough 2 weeks ago. Pt was seen at urgent care and found to have b/l ear infections x 2 weeks ago, started on doxycycline. Pt states L ear pain resolved. R ear pain persisted and worsened. Pt for the past 4 days she has had increasing pressure in her R ear and intermittent headahce in her R temporal lobe area. Pt also has intermittent nausea with an episode of vomiting yesterday. Pt states today she had some unsteadiness when ambulating and decreased hearing in R ear.

## 2024-11-15 ENCOUNTER — APPOINTMENT (OUTPATIENT)
Dept: OTOLARYNGOLOGY | Facility: CLINIC | Age: 69
End: 2024-11-15
Payer: COMMERCIAL

## 2024-11-15 VITALS
HEIGHT: 59 IN | DIASTOLIC BLOOD PRESSURE: 78 MMHG | OXYGEN SATURATION: 98 % | HEART RATE: 80 BPM | SYSTOLIC BLOOD PRESSURE: 119 MMHG

## 2024-11-15 DIAGNOSIS — H65.92 UNSPECIFIED NONSUPPURATIVE OTITIS MEDIA, LEFT EAR: ICD-10-CM

## 2024-11-15 DIAGNOSIS — R09.81 NASAL CONGESTION: ICD-10-CM

## 2024-11-15 DIAGNOSIS — H65.93 UNSPECIFIED NONSUPPURATIVE OTITIS MEDIA, BILATERAL: ICD-10-CM

## 2024-11-15 DIAGNOSIS — H93.8X9 OTHER SPECIFIED DISORDERS OF EAR, UNSPECIFIED EAR: ICD-10-CM

## 2024-11-15 PROCEDURE — 99204 OFFICE O/P NEW MOD 45 MIN: CPT | Mod: 25

## 2024-11-15 PROCEDURE — 92567 TYMPANOMETRY: CPT

## 2024-11-15 PROCEDURE — 31231 NASAL ENDOSCOPY DX: CPT

## 2024-11-15 PROCEDURE — 92557 COMPREHENSIVE HEARING TEST: CPT

## 2024-11-15 RX ORDER — FLUTICASONE PROPIONATE 50 UG/1
50 SPRAY, METERED NASAL DAILY
Qty: 1 | Refills: 2 | Status: ACTIVE | COMMUNITY
Start: 2024-11-15 | End: 1900-01-01

## 2024-11-15 RX ORDER — CEFDINIR 300 MG/1
300 CAPSULE ORAL TWICE DAILY
Qty: 20 | Refills: 0 | Status: ACTIVE | COMMUNITY
Start: 2024-11-15 | End: 1900-01-01

## 2024-11-15 RX ORDER — METHYLPREDNISOLONE 4 MG/1
4 TABLET ORAL
Qty: 1 | Refills: 0 | Status: ACTIVE | COMMUNITY
Start: 2024-11-15 | End: 1900-01-01

## 2025-01-11 ENCOUNTER — NON-APPOINTMENT (OUTPATIENT)
Age: 70
End: 2025-01-11

## 2025-01-23 ENCOUNTER — EMERGENCY (EMERGENCY)
Facility: HOSPITAL | Age: 70
LOS: 1 days | Discharge: HOME | End: 2025-01-23
Attending: EMERGENCY MEDICINE
Payer: COMMERCIAL

## 2025-01-23 VITALS
RESPIRATION RATE: 20 BRPM | HEIGHT: 59 IN | HEART RATE: 66 BPM | OXYGEN SATURATION: 95 % | WEIGHT: 147.93 LBS | SYSTOLIC BLOOD PRESSURE: 162 MMHG | DIASTOLIC BLOOD PRESSURE: 79 MMHG | TEMPERATURE: 98 F

## 2025-01-23 PROCEDURE — 70486 CT MAXILLOFACIAL W/O DYE: CPT | Mod: MC

## 2025-01-23 PROCEDURE — 76377 3D RENDER W/INTRP POSTPROCES: CPT | Mod: 26

## 2025-01-23 PROCEDURE — 76377 3D RENDER W/INTRP POSTPROCES: CPT

## 2025-01-23 PROCEDURE — 70486 CT MAXILLOFACIAL W/O DYE: CPT | Mod: 26

## 2025-01-23 PROCEDURE — 70450 CT HEAD/BRAIN W/O DYE: CPT | Mod: 26

## 2025-01-23 PROCEDURE — 99284 EMERGENCY DEPT VISIT MOD MDM: CPT

## 2025-01-23 PROCEDURE — 70450 CT HEAD/BRAIN W/O DYE: CPT | Mod: MC

## 2025-01-23 PROCEDURE — 99284 EMERGENCY DEPT VISIT MOD MDM: CPT | Mod: 25

## 2025-01-23 RX ORDER — ACETAMINOPHEN 500 MG/5ML
975 LIQUID (ML) ORAL ONCE
Refills: 0 | Status: COMPLETED | OUTPATIENT
Start: 2025-01-23 | End: 2025-01-23

## 2025-01-23 RX ADMIN — Medication 975 MILLIGRAM(S): at 09:07

## 2025-02-14 ENCOUNTER — APPOINTMENT (OUTPATIENT)
Dept: OTOLARYNGOLOGY | Facility: CLINIC | Age: 70
End: 2025-02-14

## 2025-03-04 ENCOUNTER — APPOINTMENT (OUTPATIENT)
Dept: OBGYN | Facility: CLINIC | Age: 70
End: 2025-03-04

## 2025-07-02 ENCOUNTER — APPOINTMENT (OUTPATIENT)
Dept: OBGYN | Facility: CLINIC | Age: 70
End: 2025-07-02
Payer: COMMERCIAL

## 2025-07-02 PROCEDURE — 99397 PER PM REEVAL EST PAT 65+ YR: CPT

## 2025-07-02 PROCEDURE — 96127 BRIEF EMOTIONAL/BEHAV ASSMT: CPT

## 2025-07-02 PROCEDURE — 99459 PELVIC EXAMINATION: CPT

## 2025-09-20 ENCOUNTER — APPOINTMENT (OUTPATIENT)
Dept: MAMMOGRAPHY | Facility: CLINIC | Age: 70
End: 2025-09-20